# Patient Record
Sex: FEMALE | Race: OTHER
[De-identification: names, ages, dates, MRNs, and addresses within clinical notes are randomized per-mention and may not be internally consistent; named-entity substitution may affect disease eponyms.]

---

## 2023-12-19 PROBLEM — Z00.00 ENCOUNTER FOR PREVENTIVE HEALTH EXAMINATION: Status: ACTIVE | Noted: 2023-12-19

## 2023-12-20 ENCOUNTER — APPOINTMENT (OUTPATIENT)
Dept: ORTHOPEDIC SURGERY | Facility: CLINIC | Age: 83
End: 2023-12-20
Payer: MEDICARE

## 2023-12-20 VITALS
BODY MASS INDEX: 22.15 KG/M2 | HEART RATE: 83 BPM | OXYGEN SATURATION: 96 % | WEIGHT: 125 LBS | SYSTOLIC BLOOD PRESSURE: 142 MMHG | DIASTOLIC BLOOD PRESSURE: 93 MMHG | HEIGHT: 63 IN

## 2023-12-20 DIAGNOSIS — Z85.9 PERSONAL HISTORY OF MALIGNANT NEOPLASM, UNSPECIFIED: ICD-10-CM

## 2023-12-20 DIAGNOSIS — Z78.9 OTHER SPECIFIED HEALTH STATUS: ICD-10-CM

## 2023-12-20 DIAGNOSIS — Z96.649 PRESENCE OF UNSPECIFIED ARTIFICIAL HIP JOINT: ICD-10-CM

## 2023-12-20 DIAGNOSIS — Z80.9 FAMILY HISTORY OF MALIGNANT NEOPLASM, UNSPECIFIED: ICD-10-CM

## 2023-12-20 DIAGNOSIS — Z96.643 PRESENCE OF ARTIFICIAL HIP JOINT, BILATERAL: ICD-10-CM

## 2023-12-20 DIAGNOSIS — Z60.2 PROBLEMS RELATED TO LIVING ALONE: ICD-10-CM

## 2023-12-20 PROCEDURE — 99203 OFFICE O/P NEW LOW 30 MIN: CPT

## 2023-12-20 PROCEDURE — 73522 X-RAY EXAM HIPS BI 3-4 VIEWS: CPT

## 2023-12-20 SDOH — SOCIAL STABILITY - SOCIAL INSECURITY: PROBLEMS RELATED TO LIVING ALONE: Z60.2

## 2023-12-20 NOTE — HISTORY OF PRESENT ILLNESS
[de-identified] : Patient is a pleasant 83-year-old woman who comes in for evaluation of her right low back.  She has had several months of pain there.  She notices most in the morning after sleeping at night.  She denies any falls or injuries.  No fevers or chills or difficulty with urination.  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  She denies fevers or chills.

## 2023-12-20 NOTE — DISCUSSION/SUMMARY
[de-identified] : S/P bilateral total hip replacement, progressing well.  Symptomatic and pain relief, range of motion, activity advancement and precaution strategies reviewed, including use of over-the-counter medications as needed.  I do not think that the current symptoms she is having are related to her hip arthroplasty discussed that.  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  We discussed use of symptomatic treatments including acetaminophen and heat.  She declined formal physical therapy as she does a lot of stretching on her own at this time.  She asked about the possibility of an injection I made a referral for consideration of that.  I have asked her to follow-up with me in 2 months but she will hesitate to contact us in the interim if there is any significant change or worsening.  All questions answered.

## 2023-12-20 NOTE — PHYSICAL EXAM
[de-identified] : On physical exam she is alert oriented.  She has a smooth gait with no antalgia or Trendelenburg.  Both hip incisions are well-healed.  There is no pain with gentle functional range of motion of either hip.  She is neurologically intact.  There is no pain with direct palpation in the low back and she has a negative chandelier sign. [de-identified] : 4 views of each hip are reviewed.  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